# Patient Record
Sex: MALE | URBAN - NONMETROPOLITAN AREA
[De-identification: names, ages, dates, MRNs, and addresses within clinical notes are randomized per-mention and may not be internally consistent; named-entity substitution may affect disease eponyms.]

---

## 2020-05-30 ENCOUNTER — IMPORTED ENCOUNTER (OUTPATIENT)
Dept: URBAN - NONMETROPOLITAN AREA CLINIC 1 | Facility: CLINIC | Age: 60
End: 2020-05-30

## 2020-05-30 PROBLEM — Z96.1: Noted: 2020-05-30

## 2020-05-30 PROBLEM — H25.12: Noted: 2020-05-30

## 2020-05-30 PROBLEM — H26.491: Noted: 2020-05-30

## 2020-05-30 PROBLEM — H52.4: Noted: 2020-05-30

## 2020-05-30 PROCEDURE — S0620 ROUTINE OPHTHALMOLOGICAL EXA: HCPCS

## 2020-05-30 NOTE — PATIENT DISCUSSION
Presbyopia OU - Discussed diagnosis in detail with patient- Hold RX at this time - Continue to [place] [facility] (Muture Cataract)- Discussed diagnosis in detail with patient- Discussed signs and symptoms of progression- Discussed UV protection- Recommend cataract eval with Dr. Arian Jefferson.  Patient agrees with plan - Continue to monitorPseudophakia OD with PCO OD- Discussed diagnosis in detail with patient- Patient had cataract surgery done a few years ago but not sure where- PCO noted today but stable and no treatment needed at this time - Continue to monitor

## 2022-04-10 ASSESSMENT — TONOMETRY
OD_IOP_MMHG: 14
OS_IOP_MMHG: 14

## 2022-04-10 ASSESSMENT — VISUAL ACUITY: OD_CC: 20/20
